# Patient Record
Sex: FEMALE | Race: OTHER | Employment: OTHER | URBAN - METROPOLITAN AREA
[De-identification: names, ages, dates, MRNs, and addresses within clinical notes are randomized per-mention and may not be internally consistent; named-entity substitution may affect disease eponyms.]

---

## 2021-10-17 ENCOUNTER — HOSPITAL ENCOUNTER (EMERGENCY)
Age: 46
Discharge: HOME | End: 2021-10-17
Attending: EMERGENCY MEDICINE

## 2021-10-17 VITALS
WEIGHT: 152 LBS | BODY MASS INDEX: 27.97 KG/M2 | SYSTOLIC BLOOD PRESSURE: 121 MMHG | TEMPERATURE: 97.5 F | HEART RATE: 83 BPM | DIASTOLIC BLOOD PRESSURE: 89 MMHG | HEIGHT: 62 IN | RESPIRATION RATE: 16 BRPM | OXYGEN SATURATION: 97 %

## 2021-10-17 DIAGNOSIS — S05.02XA ABRASION OF LEFT CORNEA, INITIAL ENCOUNTER: Primary | ICD-10-CM

## 2021-10-17 PROCEDURE — 99281 EMR DPT VST MAYX REQ PHY/QHP: CPT

## 2021-10-17 PROCEDURE — 74011000250 HC RX REV CODE- 250: Performed by: PHYSICIAN ASSISTANT

## 2021-10-17 RX ORDER — ERYTHROMYCIN 5 MG/G
OINTMENT OPHTHALMIC
Qty: 1 G | Refills: 0 | Status: SHIPPED | OUTPATIENT
Start: 2021-10-17 | End: 2021-10-24

## 2021-10-17 RX ORDER — TETRACAINE HYDROCHLORIDE 5 MG/ML
1 SOLUTION OPHTHALMIC
Status: COMPLETED | OUTPATIENT
Start: 2021-10-17 | End: 2021-10-17

## 2021-10-17 RX ADMIN — TETRACAINE HYDROCHLORIDE 1 DROP: 5 SOLUTION OPHTHALMIC at 15:38

## 2021-10-17 RX ADMIN — FLUORESCEIN SODIUM 1 STRIP: 1 STRIP OPHTHALMIC at 15:38

## 2021-10-17 NOTE — ED PROVIDER NOTES
Adrien Carrasco is a 54 y.o. female without major PMhx who presents to ED with cc of 8/10 L eye pain x 2 days. Pt states she got an insect in her L eye 3 days ago. States the next day she noted onset of pain and some mild erythema. Denies drainage or visual changes. Denies use of glasses or contacts. Has been taking Aleve for pain. PMHx: Reviewed, as below. Pt denies. PSHx: Reviewed, as below. Pt denies. PCP: None    There are no other complaints verbalized at this time. No past medical history on file. No past surgical history on file. No family history on file. Social History     Socioeconomic History    Marital status: SINGLE     Spouse name: Not on file    Number of children: Not on file    Years of education: Not on file    Highest education level: Not on file   Occupational History    Not on file   Tobacco Use    Smoking status: Not on file   Substance and Sexual Activity    Alcohol use: Not on file    Drug use: Not on file    Sexual activity: Not on file   Other Topics Concern    Not on file   Social History Narrative    Not on file     Social Determinants of Health     Financial Resource Strain:     Difficulty of Paying Living Expenses:    Food Insecurity:     Worried About Running Out of Food in the Last Year:     [address] in the Last Year:    Transportation Needs:     Lack of Transportation (Medical):      Lack of Transportation (Non-Medical):    Physical Activity:     Days of Exercise per Week:     Minutes of Exercise per Session:    Stress:     Feeling of Stress :    Social Connections:     Frequency of Communication with Friends and Family:     Frequency of Social Gatherings with Friends and Family:     Attends Gn[employer] Services:     Active Member of Clubs or Organizations:     Attends Club or Organization Meetings:     Marital Status:    Intimate Partner Violence:     Fear of Current or Ex-Partner:     Emotionally Abused:     Physically Abused:     Sexually Abused: ALLERGIES: Patient has no known allergies. Review of Systems   Eyes: Positive for pain and redness. Negative for discharge and visual disturbance. All other systems reviewed and are negative. Vitals:    10/17/21 1306   BP: 121/89   Pulse: 83   Resp: 16   Temp: 97.5 °F (36.4 °C)   SpO2: 97%   Weight: 68.9 kg (152 lb)   Height: 5' 2\" (1.575 m)            Physical Exam  Vitals and nursing note reviewed. Constitutional:       Appearance: Normal appearance. She is not toxic-appearing or diaphoretic. HENT:      Head: Atraumatic. Right Ear: External ear normal.      Left Ear: External ear normal.   Eyes:      Extraocular Movements: Extraocular movements intact. Conjunctiva/sclera:      Right eye: Right conjunctiva is not injected. Left eye: Left conjunctiva is injected (diffuse). Pupils: Pupils are equal, round, and reactive to light. Cardiovascular:      Rate and Rhythm: Normal rate. Pulmonary:      Effort: No respiratory distress. Abdominal:      General: There is no distension. Musculoskeletal:         General: No swelling or deformity. Cervical back: Neck supple. Skin:     General: Skin is warm and dry. Neurological:      Mental Status: She is alert and oriented to person, place, and time. Mental status is at baseline. MDM  Number of Diagnoses or Management Options     Amount and/or Complexity of Data Reviewed  Discuss the patient with other providers: yes (Dr Roger Zuñiga, ED attending)           Procedures        Procedure Note - Wood's lamp exam:  3:52 PM  Performed by: KYLIE Anaya L eye was anesthetized with tetracaine, stained with fluorescein, and examined with a Wood's lamp, using lid eversion. Foreign body: no  Fluorescein uptake: yes, showing slight uptake over inferomedial portion of cornea.   The procedure took 1-15 minutes, and pt tolerated well.         4:05 PM   [id number] used at time of discahrge      VITAL SIGNS:  Vitals:    10/17/21 1306   BP: 121/89   Pulse: 83   Resp: 16   Temp: 97.5 °F (36.4 °C)   SpO2: 97%   Weight: 68.9 kg (152 lb)   Height: 5' 2\" (1.575 m)         LABS:  No results found for this or any previous visit (from the past 24 hour(s)). IMAGING:  No orders to display         Medications During Visit:  Medications   tetracaine HCl (PF) (PONTOCAINE) 0.5 % ophthalmic solution 1 Drop (1 Drop Left Eye Given by Provider 10/17/21 1538)   fluorescein (FUL-S[place]) 1 mg ophthalmic strip 1 Strip (1 Strip Both Eyes Given by Provider 10/17/21 1538)         DECISION MAKING:    Marika Hernandes is a 54 y.o. female who comes in as above. Presents afebrile and hemodynamically stable with eye pain and some redness over the past 2 days, with onset the day after she got a bug in her eye. Fluorescein stain demonstrating no region of slight uptake suggestive of abrasion. She denies use of glasses or contacts. We will discharge with course of erythromycin ointment and have follow-up with ophthalmology. I have discussed care with ED attending. Pt and I have discussed close return precautions as well as follow up recommendations. Opportunity for questions presented. Pt verbalizes their understanding and agreement with care plan. IMPRESSION:  1.  Abrasion of left cornea, initial encounter        DISPOSITION:  Discharged      Discharge Medication List as of 10/17/2021  3:54 PM           Follow-up Information     Follow up With Specialties Details Why Contact Info    [facility] EMERGENCY DEPT Emergency Medicine Go to  As needed, If symptoms worsen Trini Johnson [phone] [facility]    [address] [facility].  Schedule an appointment as soon as possible for a visit  or your primary care physician [address] [facility]  Schedule an appointment as soon as possible for a visit   [address]  [phone]            The patient is asked to follow-up with their primary care provider and any other physicians as above. We have discussed strict return precautions and the patient is asked to return promptly for any increased concerns or worsening of symptoms and for return precautions regarding their symptoms today. They can return to this emergency department or any other emergency department. I have discussed with them results as above and presented opportunity for questions. They verbalize their understanding of the above and agreement with care plan. Please note that this dictation was completed with [employer], the [employer] management voice recognition software. Quite often unanticipated grammatical, syntax, homophones, and other interpretive errors are inadvertently transcribed by the computer software. Please disregard these errors. Please excuse any errors that have escaped final proofreading.